# Patient Record
Sex: FEMALE | Race: WHITE | NOT HISPANIC OR LATINO | Employment: FULL TIME | URBAN - METROPOLITAN AREA
[De-identification: names, ages, dates, MRNs, and addresses within clinical notes are randomized per-mention and may not be internally consistent; named-entity substitution may affect disease eponyms.]

---

## 2019-10-07 LAB
C TRACH DNA SPEC QL PROBE+SIG AMP: NEGATIVE
HBV SURFACE AG SERPL QL IA: NEGATIVE
HIV 1+2 AB+HIV1 P24 AG SERPL QL IA: NEGATIVE
N GONORRHOEA DNA SPEC QL PROBE+SIG AMP: NEGATIVE
RUBELLA ABY IGG: NORMAL

## 2020-04-08 LAB — GROUP B STREP PCR: NEGATIVE

## 2020-04-13 ENCOUNTER — HOSPITAL ENCOUNTER (OUTPATIENT)
Facility: CLINIC | Age: 25
End: 2020-04-13
Admitting: OBSTETRICS & GYNECOLOGY
Payer: COMMERCIAL

## 2020-04-28 ENCOUNTER — OFFICE VISIT (OUTPATIENT)
Dept: URGENT CARE | Facility: URGENT CARE | Age: 25
End: 2020-04-28
Payer: COMMERCIAL

## 2020-04-28 DIAGNOSIS — Z37.9 NORMAL LABOR: Primary | ICD-10-CM

## 2020-04-28 DIAGNOSIS — Z37.9 NORMAL LABOR: ICD-10-CM

## 2020-04-28 PROCEDURE — 99000 SPECIMEN HANDLING OFFICE-LAB: CPT | Performed by: OBSTETRICS & GYNECOLOGY

## 2020-04-28 PROCEDURE — 99207 ZZC NO BILLABLE SERVICE THIS VISIT: CPT

## 2020-04-28 PROCEDURE — U0003 INFECTIOUS AGENT DETECTION BY NUCLEIC ACID (DNA OR RNA); SEVERE ACUTE RESPIRATORY SYNDROME CORONAVIRUS 2 (SARS-COV-2) (CORONAVIRUS DISEASE [COVID-19]), AMPLIFIED PROBE TECHNIQUE, MAKING USE OF HIGH THROUGHPUT TECHNOLOGIES AS DESCRIBED BY CMS-2020-01-R: HCPCS | Mod: 90 | Performed by: OBSTETRICS & GYNECOLOGY

## 2020-04-28 NOTE — NURSING NOTE
Provided patient with COVID19 education handout.  Elva Armstrong CMA ([facility]) 4/28/2020 2:18 PM

## 2020-04-29 LAB
SARS-COV-2 RNA SPEC QL NAA+PROBE: NOT DETECTED
SPECIMEN SOURCE: NORMAL

## 2020-04-30 ENCOUNTER — HOSPITAL ENCOUNTER (INPATIENT)
Facility: CLINIC | Age: 25
LOS: 1 days | Discharge: HOME | End: 2020-05-01
Attending: OBSTETRICS & GYNECOLOGY | Admitting: OBSTETRICS & GYNECOLOGY
Payer: COMMERCIAL

## 2020-04-30 ENCOUNTER — ANESTHESIA (OUTPATIENT)
Dept: OBGYN | Facility: CLINIC | Age: 25
End: 2020-04-30

## 2020-04-30 ENCOUNTER — ANESTHESIA EVENT (OUTPATIENT)
Dept: OBGYN | Facility: CLINIC | Age: 25
End: 2020-04-30

## 2020-04-30 PROBLEM — Z87.59 STATUS POST VACUUM-ASSISTED VAGINAL DELIVERY: Status: ACTIVE | Noted: 2020-04-30

## 2020-04-30 PROBLEM — Z37.9 NORMAL LABOR: Status: ACTIVE | Noted: 2020-04-30

## 2020-04-30 LAB
ABO + RH BLD: NORMAL
ABO + RH BLD: NORMAL
BASOPHILS # BLD AUTO: 0 10E9/L (ref 0–0.2)
BASOPHILS NFR BLD AUTO: 0.2 %
BLD GP AB SCN SERPL QL: NORMAL
BLOOD BANK CMNT PATIENT-IMP: NORMAL
DIFFERENTIAL METHOD BLD: ABNORMAL
EOSINOPHIL # BLD AUTO: 0.1 10E9/L (ref 0–0.7)
EOSINOPHIL NFR BLD AUTO: 1.4 %
ERYTHROCYTE [DISTWIDTH] IN BLOOD BY AUTOMATED COUNT: 14 % (ref 10–15)
HCT VFR BLD AUTO: 37.5 % (ref 35–47)
HGB BLD-MCNC: 12.1 G/DL (ref 11.7–15.7)
IMM GRANULOCYTES # BLD: 0 10E9/L (ref 0–0.4)
IMM GRANULOCYTES NFR BLD: 0.4 %
LYMPHOCYTES # BLD AUTO: 1.4 10E9/L (ref 0.8–5.3)
LYMPHOCYTES NFR BLD AUTO: 16.8 %
MCH RBC QN AUTO: 28.1 PG (ref 26.5–33)
MCHC RBC AUTO-ENTMCNC: 32.3 G/DL (ref 31.5–36.5)
MCV RBC AUTO: 87 FL (ref 78–100)
MONOCYTES # BLD AUTO: 0.8 10E9/L (ref 0–1.3)
MONOCYTES NFR BLD AUTO: 9.5 %
NEUTROPHILS # BLD AUTO: 5.8 10E9/L (ref 1.6–8.3)
NEUTROPHILS NFR BLD AUTO: 71.7 %
NRBC # BLD AUTO: 0 10*3/UL
NRBC BLD AUTO-RTO: 0 /100
PLATELET # BLD AUTO: 116 10E9/L (ref 150–450)
RBC # BLD AUTO: 4.3 10E12/L (ref 3.8–5.2)
SPECIMEN EXP DATE BLD: NORMAL
T PALLIDUM AB SER QL: NONREACTIVE
WBC # BLD AUTO: 8 10E9/L (ref 4–11)

## 2020-04-30 PROCEDURE — 36415 COLL VENOUS BLD VENIPUNCTURE: CPT | Performed by: OBSTETRICS & GYNECOLOGY

## 2020-04-30 PROCEDURE — 10907ZC DRAINAGE OF AMNIOTIC FLUID, THERAPEUTIC FROM PRODUCTS OF CONCEPTION, VIA NATURAL OR ARTIFICIAL OPENING: ICD-10-PCS | Performed by: OBSTETRICS & GYNECOLOGY

## 2020-04-30 PROCEDURE — G0378 HOSPITAL OBSERVATION PER HR: HCPCS

## 2020-04-30 PROCEDURE — 86850 RBC ANTIBODY SCREEN: CPT | Performed by: OBSTETRICS & GYNECOLOGY

## 2020-04-30 PROCEDURE — 85025 COMPLETE CBC W/AUTO DIFF WBC: CPT | Performed by: OBSTETRICS & GYNECOLOGY

## 2020-04-30 PROCEDURE — 12000035 ZZH R&B POSTPARTUM

## 2020-04-30 PROCEDURE — 25000125 ZZHC RX 250: Performed by: OBSTETRICS & GYNECOLOGY

## 2020-04-30 PROCEDURE — 37000011 ZZH ANESTHESIA WARD SERVICE

## 2020-04-30 PROCEDURE — 86780 TREPONEMA PALLIDUM: CPT | Performed by: OBSTETRICS & GYNECOLOGY

## 2020-04-30 PROCEDURE — 72200001 ZZH LABOR CARE VAGINAL DELIVERY SINGLE

## 2020-04-30 PROCEDURE — 0HQ9XZZ REPAIR PERINEUM SKIN, EXTERNAL APPROACH: ICD-10-PCS | Performed by: OBSTETRICS & GYNECOLOGY

## 2020-04-30 PROCEDURE — 86901 BLOOD TYPING SEROLOGIC RH(D): CPT | Performed by: OBSTETRICS & GYNECOLOGY

## 2020-04-30 PROCEDURE — 25000128 H RX IP 250 OP 636: Performed by: ANESTHESIOLOGY

## 2020-04-30 PROCEDURE — 25000132 ZZH RX MED GY IP 250 OP 250 PS 637: Performed by: OBSTETRICS & GYNECOLOGY

## 2020-04-30 PROCEDURE — 25800030 ZZH RX IP 258 OP 636: Performed by: OBSTETRICS & GYNECOLOGY

## 2020-04-30 PROCEDURE — 12000000 ZZH R&B MED SURG/OB

## 2020-04-30 PROCEDURE — 86900 BLOOD TYPING SEROLOGIC ABO: CPT | Performed by: OBSTETRICS & GYNECOLOGY

## 2020-04-30 RX ORDER — TRANEXAMIC ACID 10 MG/ML
1 INJECTION, SOLUTION INTRAVENOUS EVERY 30 MIN PRN
Status: DISCONTINUED | OUTPATIENT
Start: 2020-04-30 | End: 2020-04-30

## 2020-04-30 RX ORDER — CARBOPROST TROMETHAMINE 250 UG/ML
250 INJECTION, SOLUTION INTRAMUSCULAR
Status: DISCONTINUED | OUTPATIENT
Start: 2020-04-30 | End: 2020-04-30

## 2020-04-30 RX ORDER — NALOXONE HYDROCHLORIDE 0.4 MG/ML
.1-.4 INJECTION, SOLUTION INTRAMUSCULAR; INTRAVENOUS; SUBCUTANEOUS
Status: DISCONTINUED | OUTPATIENT
Start: 2020-04-30 | End: 2020-04-30

## 2020-04-30 RX ORDER — ROPIVACAINE HYDROCHLORIDE 2 MG/ML
10 INJECTION, SOLUTION EPIDURAL; INFILTRATION; PERINEURAL ONCE
Status: DISCONTINUED | OUTPATIENT
Start: 2020-04-30 | End: 2020-05-01 | Stop reason: HOSPADM

## 2020-04-30 RX ORDER — HYDROCORTISONE 2.5 %
CREAM (GRAM) TOPICAL 3 TIMES DAILY PRN
Status: DISCONTINUED | OUTPATIENT
Start: 2020-04-30 | End: 2020-05-01 | Stop reason: HOSPADM

## 2020-04-30 RX ORDER — EPHEDRINE SULFATE 50 MG/ML
5 INJECTION, SOLUTION INTRAMUSCULAR; INTRAVENOUS; SUBCUTANEOUS
Status: DISCONTINUED | OUTPATIENT
Start: 2020-04-30 | End: 2020-05-01 | Stop reason: HOSPADM

## 2020-04-30 RX ORDER — OXYTOCIN 10 [USP'U]/ML
10 INJECTION, SOLUTION INTRAMUSCULAR; INTRAVENOUS
Status: DISCONTINUED | OUTPATIENT
Start: 2020-04-30 | End: 2020-05-01 | Stop reason: HOSPADM

## 2020-04-30 RX ORDER — OXYTOCIN 10 [USP'U]/ML
10 INJECTION, SOLUTION INTRAMUSCULAR; INTRAVENOUS
Status: DISCONTINUED | OUTPATIENT
Start: 2020-04-30 | End: 2020-04-30

## 2020-04-30 RX ORDER — IBUPROFEN 400 MG/1
800 TABLET, FILM COATED ORAL
Status: DISCONTINUED | OUTPATIENT
Start: 2020-04-30 | End: 2020-05-01 | Stop reason: HOSPADM

## 2020-04-30 RX ORDER — OXYTOCIN/0.9 % SODIUM CHLORIDE 30/500 ML
1-24 PLASTIC BAG, INJECTION (ML) INTRAVENOUS CONTINUOUS
Status: DISCONTINUED | OUTPATIENT
Start: 2020-04-30 | End: 2020-04-30

## 2020-04-30 RX ORDER — OXYTOCIN/0.9 % SODIUM CHLORIDE 30/500 ML
100-340 PLASTIC BAG, INJECTION (ML) INTRAVENOUS CONTINUOUS PRN
Status: DISCONTINUED | OUTPATIENT
Start: 2020-04-30 | End: 2020-04-30

## 2020-04-30 RX ORDER — ACETAMINOPHEN 325 MG/1
650 TABLET ORAL EVERY 4 HOURS PRN
Status: DISCONTINUED | OUTPATIENT
Start: 2020-04-30 | End: 2020-04-30

## 2020-04-30 RX ORDER — FENTANYL CITRATE 50 UG/ML
50-100 INJECTION, SOLUTION INTRAMUSCULAR; INTRAVENOUS
Status: DISCONTINUED | OUTPATIENT
Start: 2020-04-30 | End: 2020-04-30

## 2020-04-30 RX ORDER — TRANEXAMIC ACID 10 MG/ML
1 INJECTION, SOLUTION INTRAVENOUS EVERY 30 MIN PRN
Status: DISCONTINUED | OUTPATIENT
Start: 2020-04-30 | End: 2020-05-01 | Stop reason: HOSPADM

## 2020-04-30 RX ORDER — OXYTOCIN/0.9 % SODIUM CHLORIDE 30/500 ML
100 PLASTIC BAG, INJECTION (ML) INTRAVENOUS CONTINUOUS
Status: DISCONTINUED | OUTPATIENT
Start: 2020-04-30 | End: 2020-05-01 | Stop reason: HOSPADM

## 2020-04-30 RX ORDER — AMOXICILLIN 250 MG
1 CAPSULE ORAL 2 TIMES DAILY
Status: DISCONTINUED | OUTPATIENT
Start: 2020-04-30 | End: 2020-05-01 | Stop reason: HOSPADM

## 2020-04-30 RX ORDER — ONDANSETRON 4 MG/1
4 TABLET, ORALLY DISINTEGRATING ORAL EVERY 6 HOURS PRN
Status: DISCONTINUED | OUTPATIENT
Start: 2020-04-30 | End: 2020-05-01 | Stop reason: HOSPADM

## 2020-04-30 RX ORDER — BISACODYL 10 MG
10 SUPPOSITORY, RECTAL RECTAL DAILY PRN
Status: DISCONTINUED | OUTPATIENT
Start: 2020-05-02 | End: 2020-05-01 | Stop reason: HOSPADM

## 2020-04-30 RX ORDER — OXYCODONE AND ACETAMINOPHEN 5; 325 MG/1; MG/1
1 TABLET ORAL
Status: DISCONTINUED | OUTPATIENT
Start: 2020-04-30 | End: 2020-04-30

## 2020-04-30 RX ORDER — OXYTOCIN/0.9 % SODIUM CHLORIDE 30/500 ML
340 PLASTIC BAG, INJECTION (ML) INTRAVENOUS CONTINUOUS PRN
Status: DISCONTINUED | OUTPATIENT
Start: 2020-04-30 | End: 2020-05-01 | Stop reason: HOSPADM

## 2020-04-30 RX ORDER — ONDANSETRON 2 MG/ML
4 INJECTION INTRAMUSCULAR; INTRAVENOUS EVERY 6 HOURS PRN
Status: DISCONTINUED | OUTPATIENT
Start: 2020-04-30 | End: 2020-05-01 | Stop reason: HOSPADM

## 2020-04-30 RX ORDER — NALOXONE HYDROCHLORIDE 0.4 MG/ML
.1-.4 INJECTION, SOLUTION INTRAMUSCULAR; INTRAVENOUS; SUBCUTANEOUS
Status: DISCONTINUED | OUTPATIENT
Start: 2020-04-30 | End: 2020-05-01 | Stop reason: HOSPADM

## 2020-04-30 RX ORDER — MODIFIED LANOLIN
OINTMENT (GRAM) TOPICAL
Status: DISCONTINUED | OUTPATIENT
Start: 2020-04-30 | End: 2020-05-01 | Stop reason: HOSPADM

## 2020-04-30 RX ORDER — ACETAMINOPHEN 325 MG/1
650 TABLET ORAL EVERY 4 HOURS PRN
Status: DISCONTINUED | OUTPATIENT
Start: 2020-04-30 | End: 2020-05-01 | Stop reason: HOSPADM

## 2020-04-30 RX ORDER — NALBUPHINE HYDROCHLORIDE 10 MG/ML
2.5-5 INJECTION, SOLUTION INTRAMUSCULAR; INTRAVENOUS; SUBCUTANEOUS EVERY 6 HOURS PRN
Status: DISCONTINUED | OUTPATIENT
Start: 2020-04-30 | End: 2020-05-01 | Stop reason: HOSPADM

## 2020-04-30 RX ORDER — METHYLERGONOVINE MALEATE 0.2 MG/ML
200 INJECTION INTRAVENOUS
Status: DISCONTINUED | OUTPATIENT
Start: 2020-04-30 | End: 2020-04-30

## 2020-04-30 RX ORDER — SODIUM CHLORIDE, SODIUM LACTATE, POTASSIUM CHLORIDE, CALCIUM CHLORIDE 600; 310; 30; 20 MG/100ML; MG/100ML; MG/100ML; MG/100ML
INJECTION, SOLUTION INTRAVENOUS CONTINUOUS
Status: DISCONTINUED | OUTPATIENT
Start: 2020-04-30 | End: 2020-04-30

## 2020-04-30 RX ORDER — LIDOCAINE 40 MG/G
CREAM TOPICAL
Status: DISCONTINUED | OUTPATIENT
Start: 2020-04-30 | End: 2020-04-30

## 2020-04-30 RX ORDER — ONDANSETRON 2 MG/ML
4 INJECTION INTRAMUSCULAR; INTRAVENOUS EVERY 6 HOURS PRN
Status: DISCONTINUED | OUTPATIENT
Start: 2020-04-30 | End: 2020-04-30

## 2020-04-30 RX ORDER — IBUPROFEN 400 MG/1
800 TABLET, FILM COATED ORAL EVERY 6 HOURS PRN
Status: DISCONTINUED | OUTPATIENT
Start: 2020-04-30 | End: 2020-05-01 | Stop reason: HOSPADM

## 2020-04-30 RX ORDER — AMOXICILLIN 250 MG
2 CAPSULE ORAL 2 TIMES DAILY
Status: DISCONTINUED | OUTPATIENT
Start: 2020-04-30 | End: 2020-05-01 | Stop reason: HOSPADM

## 2020-04-30 RX ADMIN — Medication 12 ML/HR: at 15:50

## 2020-04-30 RX ADMIN — SODIUM CHLORIDE, POTASSIUM CHLORIDE, SODIUM LACTATE AND CALCIUM CHLORIDE: 600; 310; 30; 20 INJECTION, SOLUTION INTRAVENOUS at 13:00

## 2020-04-30 RX ADMIN — ACETAMINOPHEN 650 MG: 325 TABLET, FILM COATED ORAL at 22:31

## 2020-04-30 RX ADMIN — IBUPROFEN 800 MG: 400 TABLET, FILM COATED ORAL at 22:31

## 2020-04-30 RX ADMIN — SODIUM CHLORIDE, POTASSIUM CHLORIDE, SODIUM LACTATE AND CALCIUM CHLORIDE: 600; 310; 30; 20 INJECTION, SOLUTION INTRAVENOUS at 16:30

## 2020-04-30 RX ADMIN — Medication 2 MILLI-UNITS/MIN: at 09:06

## 2020-04-30 RX ADMIN — Medication 12 ML/HR: at 11:04

## 2020-04-30 RX ADMIN — ROPIVACAINE HYDROCHLORIDE 10 ML: 2 INJECTION, SOLUTION EPIDURAL; INFILTRATION at 11:40

## 2020-04-30 SDOH — HEALTH STABILITY: MENTAL HEALTH: HOW OFTEN DO YOU HAVE A DRINK CONTAINING ALCOHOL?: NEVER

## 2020-04-30 ASSESSMENT — MIFFLIN-ST. JEOR: SCORE: 1507.46

## 2020-04-30 NOTE — PLAN OF CARE
"Patient requesting epidural at about 1030, fluid bolus given and patient positioned.  Dr Torres here for epidural, during test dose patient HR increased from 80 to 105 and patient \"felt like ears were going to pop\" and was dizzy, but resolved.  Epidural procedure completed and after 20 minutes patient was feeling no relief.  Dr Torres called back to room to assess.  Decision made to pull epidural and replace.  Patient tolerated well and was feeling some relief after this but still uncomfortable with pressure and tightness.  Dr Graham came to room to assess and gave ok to use PCEA as needed.  Patient pressed this twice and is now feeling more comfortable.  Cervix 5/90/0, Dr Biggs updated via pager.  Will continue to monitor.  "

## 2020-04-30 NOTE — PLAN OF CARE
VE x1, no pop offs, 35 sec accrual time with delivery of viable male infant.  Apgars 8/9.  See delivery summary.

## 2020-04-30 NOTE — PLAN OF CARE
Cha is a  admitted for an elective induction at 39w0d.  She states that his has been a healthy pregnancy with no significant complications or monitoring.  GBS negative, no bleeding or leaking of fluid.  Admitted to room 213 and oriented to room and plan of care, all questions answered.  Dr Biggs came to AROM, clear fluid.  Pitocin started and patient starting to get uncomfortable with contractions, plan for early epidural.  Will continue to monitor.

## 2020-04-30 NOTE — H&P
[facility] Labor and Delivery History and Physical    Cha Goode MRN# [id number]   Age: 24 year old YOB: 1995     Date of Admission:  2020    Primary care provider: Hellen Biggs           Chief Complaint:   Cha Goode is a 24 year old female  who is 39w0d pregnant and being admitted for induction of labor, indication elective with favorable cervix.  Pt with regular prenatal care; sequential test normal; normal 20 week ultrasound; she transferred care to us in 3rd trimester. GBS negative; pt desires elective induction. COVID negative 2 days ago.          Pregnancy history:     OBSTETRIC HISTORY:    OB History    Para Term  AB Living   1 0 0 0 0 0   SAB TAB Ectopic Multiple Live Births   0 0 0 0 0      # Outcome Date GA Lbr Juan Jose/2nd Weight Sex Delivery Anes PTL Lv   1 Current                EDC: Estimated Date of Delivery: 20    Prenatal Labs: No results found for: ABO, RH, AS, HEPBANG, CHPCRT, GCPCRT, TREPAB, RUBELLAABIGG, HGB, HIV    GBS Status:   No results found for: GBS    Active Problem List  Patient Active Problem List   Diagnosis     Normal labor       Medication Prior to Admission  Medications Prior to Admission   Medication Sig Dispense Refill Last Dose     ferrous sulfate 220 (44 Fe) MG/5ML ELIX Take 220 mg by mouth daily   2020 at Unknown time     Prenatal Vit-Fe Fumarate-FA (PRENATAL MULTIVITAMIN  PLUS IRON) 27-1 MG TABS Take 1 tablet by mouth daily   2020 at Unknown time   .        Maternal Past Medical History:   History reviewed. No pertinent past medical history.                    Family History:   This patient has no significant family history            Social History:   This patient has no significant social history         Review of Systems:   The Review of Systems is negative other than noted in the HPI          Physical Exam:   Vitals were reviewed  Temp: 98.8  F (37.1  C) Temp src: Temporal BP: 124/83                  Cervix: 1 1/2cm/70%/0 station; AROM with clear fluid  Presentation:Cephalic  Fetal Heart Rate Tracing: reactive and reassuring  Tocometer: external monitor  EFW by my Leop[place] 7lb 10 oz.                       Assessment:   Cha Goode is a  at 39w0d pregnant female admitted with induction of labor, indication elective with favorable cervix.          Plan:   1. Start pitocin; epidural prn.    Hellen Biggs MD

## 2020-04-30 NOTE — L&D DELIVERY NOTE
VAGINAL DELIVERY NOTE    Date of Delivery:  2020    Cha Goode is a 24 year old  who was admitted at 39w0d due to elective induction with favorable cervix.   Her prenatal course was significant for normal care.   Her GBS was negative.  After admission to Labor and Delivery, the patient started on pitocin. Patient had regular contractions.   She received epidural for pain.  She has AROM with clear fluid at 2 cm dilated.   During the first stage the FHRT was category I.     The patient progressed to complete dilation. The FHT's were category I and were monitored with external; a scalp electrode was placed during pushing to better evaluate the depth of the variable decelerations which were mild at that time.  She pushed with good effort for less than 30 minutes. Due to deepening variable decelerations (there was moderate variability and reactive for FHRT), vacuum assisted vaginal delivery was discussed. Alternative strategies discussed and charge and NICU teams notified; risks of vacuum to the baby discussed in detail and verbal consent obtained from the patient; the Kiwi vacuum was applied at 4+ station TAWNY and one pull for 15 seconds and no popoffs resulted in delivery of vertex over intact perineum.     The fetal vertex delivered over an intact perineum in an TAWNY position. A nuchal cord was not present but there was a somewhat short cord. The remainder of the baby was delivered easily- a viable male infant with Apgars of 8/9. There was no shoulder dystocia present. The baby was placed on the maternal abdomen and delayed cord clamping was done. The weight was not done immediately so as to maximize bonding and skin to skin contact.  The patient received IV pitocin immediately after delivery. Cord blood was obtained.  The placenta delivered spontaneously intact and was not sent to pathology.   The baby's head was inspected and normal caput noted and no abnormalities.    There was a left vaginal  laceration that extended outside to lower labia that was repaired with 3-0 Vicryl in the standard fashion with good hemostasis. A first degree laceration above the urethra was repaired with one suture of 4-0 vicryl.   The vagina and perineum were inspected and no additional tears noted. Count were correct and the fundus was massaged with the RN and it was firm with no active clots or bleeding.   EBL=150 ml.    Mother and infant were doing well.      No gross fetal defects were observed.  The baby was stable in mom's arms.  The mother was stable in delivery bed.  Sponge and sharp count is correct. There were no complications.    Hellen Biggs MD

## 2020-05-01 VITALS
OXYGEN SATURATION: 99 % | BODY MASS INDEX: 25.43 KG/M2 | HEIGHT: 67 IN | SYSTOLIC BLOOD PRESSURE: 105 MMHG | HEART RATE: 83 BPM | WEIGHT: 162 LBS | RESPIRATION RATE: 16 BRPM | TEMPERATURE: 98.2 F | DIASTOLIC BLOOD PRESSURE: 70 MMHG

## 2020-05-01 PROCEDURE — 25000132 ZZH RX MED GY IP 250 OP 250 PS 637: Performed by: OBSTETRICS & GYNECOLOGY

## 2020-05-01 RX ADMIN — ACETAMINOPHEN 650 MG: 325 TABLET, FILM COATED ORAL at 04:23

## 2020-05-01 RX ADMIN — ACETAMINOPHEN 650 MG: 325 TABLET, FILM COATED ORAL at 11:04

## 2020-05-01 RX ADMIN — IBUPROFEN 800 MG: 400 TABLET, FILM COATED ORAL at 04:23

## 2020-05-01 RX ADMIN — SENNOSIDES AND DOCUSATE SODIUM 1 TABLET: 8.6; 5 TABLET ORAL at 11:04

## 2020-05-01 RX ADMIN — IBUPROFEN 800 MG: 400 TABLET, FILM COATED ORAL at 11:04

## 2020-05-01 NOTE — ANESTHESIA POSTPROCEDURE EVALUATION
Patient: Cha Goode    * No procedures listed *    Diagnosis:* No pre-op diagnosis entered *  Diagnosis Additional Information: No value filed.    Anesthesia Type:  No value filed.    Note:  Anesthesia Post Evaluation    Patient location during evaluation: Floor (Postpartum Unit)  Patient participation: Able to fully participate in evaluation  Level of consciousness: awake and alert  Pain management: adequate  Airway patency: patent  Cardiovascular status: acceptable and hemodynamically stable  Respiratory status: acceptable, spontaneous ventilation and unassisted  Hydration status: acceptable  PONV: none       Comments: Pt denies epidural-related complaints.         Last vitals:  Vitals:    04/30/20 2012 05/01/20 0103 05/01/20 0800   BP: 120/78 105/68 102/68   Pulse: 113 83    Resp: 16 14 16   Temp: 37.2  C (98.9  F) 36.7  C (98.1  F) 36.8  C (98.2  F)   SpO2:            Electronically Signed By: Israel Barros MD  May 1, 2020  3:18 PM

## 2020-05-01 NOTE — PLAN OF CARE
VSS. Fundus firm and midline. Scant flow. Pain /010, pain controled with tylenol and ibuprofen per MAR. Breastfeeding. Ambulating free of dizziness or lightheaded. Voiding without difficulty. Encouraged to call with needs, questions, or concerns. Will continue to monitor.     Discharge instructions reviewed. Questions answered. Baby bands checked. Patient discharged with family at 1720.

## 2020-05-01 NOTE — PLAN OF CARE
Data: Cha Goode transferred to [facility] via wheelchair at 1945. Baby transferred via parent's arms.  Action: Receiving unit notified of transfer: Yes. Patient and family notified of room change. Report given to LENA Moya RN at 1950. Belongings sent to receiving unit. Accompanied by Registered Nurse. Oriented patient to surroundings. Call light within reach. ID bands double-checked with receiving RN.  Response: Patient tolerated transfer and is stable.

## 2020-05-01 NOTE — ANESTHESIA POSTPROCEDURE EVALUATION
Patient: Cha Goode        Diagnosis:* No surgery found *  Diagnosis Additional Information: No value filed.    Anesthesia Type:  No value filed.    Note:  Anesthesia Post Evaluation    Patient location during evaluation: Floor (Postpartum Unit)  Patient participation: Able to fully participate in evaluation  Level of consciousness: awake and alert  Pain management: adequate  Airway patency: patent  Cardiovascular status: acceptable and hemodynamically stable  Respiratory status: acceptable, spontaneous ventilation and unassisted  Hydration status: acceptable  PONV: none       Comments: Pt denies epidural-related complaints.         Last vitals:  Vitals:    04/30/20 2012 05/01/20 0103 05/01/20 0800   BP: 120/78 105/68 102/68   Pulse: 113 83    Resp: 16 14 16   Temp: 37.2  C (98.9  F) 36.7  C (98.1  F) 36.8  C (98.2  F)   SpO2:            Electronically Signed By: Israel Barros MD  May 1, 2020  3:06 PM

## 2020-05-01 NOTE — PROGRESS NOTES
"Cha Goode  May 1, 2020    S: pt doing well.  Pain well controlled,  Ambulating without difficulty.  Tolerating po intake.  Decreased lochia.  Breast feeding.    O: /68   Pulse 83   Temp 98.1  F (36.7  C) (Oral)   Resp 14   Ht 1.702 m (5' 7\")   Wt 73.5 kg (162 lb)   SpO2 99%   Breastfeeding Unknown   BMI 25.37 kg/m    Recent Labs   Lab 20  0907   HGB 12.1     Abdomen: soft, non tender, fundus firm below the umbilicus  Ext: non tender, no edema or erythema    A/P: s/p  PPD #1  Doing well  Continue routine post partum care  Discharge planning for today    Grace Benson MD  "

## 2020-05-01 NOTE — PLAN OF CARE
Vital signs stable. Postpartum assessment WDL. Pain controlled with tylenol and ibuprofen. Patient voiding without difficulty. Breastfeeding on cue with assist. IV removed. Patient and infant bonding well. Will continue with current plan of care.

## 2020-05-01 NOTE — LACTATION NOTE
Initial visit with Cha and infant. Infant sleepy and skin to skin with Cha through visit. Easily hand expressed drops. Infant did not wake. Discussed keeping infant skin to skin and attempting feeding again within 2-3 hours or sooner if infant cues. Cha states feeds have been going well and this is infant's first sleepy phase.  Breastfeeding general information reviewed.   Advised to breastfeed exclusively, on demand, avoid pacifiers, bottles and formula unless medically indicated.  Encouraged rooming in, skin to skin, feeding on demand 8-12x/day or sooner if baby cues.  Explained benefits of holding and skin to skin. Discussed typical feeding pattern for the next 24-48 hours.  Breastfeeding going well per mother. Pt has pump for use at home.  No further questions at this time. Will follow as needed.     BROOK Quigley RN, BSN, PHN, IBCLC

## 2020-05-01 NOTE — LACTATION NOTE
Follow up Lactation visit with Cah, significant other Aftab, & baby boy. Cha reports feeding is going well in general, but baby has been sleepier at last two feeds. Cha has hand expressed drops of EBM at both feeds and given them via spoon. Getting ready for discharge this afternoon, pending baby's 24 hour screenings. Reviewed milk supply and engorgement. Reviewed typical timeline of milk supply initiation and progression over first 3-5 days postpartum. Discussed comfort measures for engorgement, plugged duct treatment, and warning signs of breast infection.    Cha had questions about when to start pumping to store milk, as she is going back to work in only 5 weeks. Reviewed general recommendation to wait to start pumping to store milk until breastfeeding is well established unless infant needs to supplement for medical reasons or feeding is poor. Since baby has been sleepier today, discussed pumping at home after feedings if feedings do not go well or he is sleepy at breast. Also discussed she could start pumping at 3-4 weeks after delivery if she would like to, to store milk at that time. Cha appreciative of informaiton.    Feeding plan: Recommend unlimited, frequent breast feedings: At least 8 - 12 times every 24 hours. Pump if baby is not feeding well at home, and see Lactation or pediatric provider if breastfeeding is not going well. Avoid pacifiers and supplementation with formula unless medically indicated. Encouraged use of feeding log and to record feedings, and void/stool patterns. Cha has a pump for home use. Follow up with Edna Grissom, encouraged Lactation follow up within the week due to early discharge and first time breastfeeding. Reviewed outpatient lactation resources. Cha appreciative of visit.    Deb Santiago, RN-C, IBCLC, MNN, PHN, BSN

## 2020-05-01 NOTE — PLAN OF CARE
Vital signs  and assessments wnl. Patient is up independently, voiding , pain well controlled with tylenol and ibuprofen given as prescribed. Encouraged to continue to ambulate as able and void frequently. Patient is bonding well with infant.

## 2020-05-05 RX ORDER — ROPIVACAINE HYDROCHLORIDE 2 MG/ML
INJECTION, SOLUTION EPIDURAL; INFILTRATION; PERINEURAL PRN
Status: DISCONTINUED | OUTPATIENT
Start: 2020-04-30 | End: 2020-05-05 | Stop reason: HOSPADM

## 2020-05-05 NOTE — ANESTHESIA PROCEDURE NOTES
Procedure note : epidural catheter  Staff -   Anesthesiologist:  Jani Torres MD      Performed By: anesthesiologist        Pre-Procedure  Performed by Jani Torres MD  Location: OB      Pre-Anesthestic Checklist: patient identified, IV checked, risks and benefits discussed, informed consent, monitors and equipment checked, pre-op evaluation and at physician/surgeon's request    Timeout  Correct Patient: Yes   Correct Procedure: Yes   Correct Site: Yes   Correct Laterality: N/A   Correct Position: Yes   Site Marked: N/A   .   Procedure Documentation    .    Procedure: epidural catheter, .   Patient Position:sitting Insertion Site:L2-3  (midline approach) Injection technique: LORT saline   Local skin infiltrated with mL of 1% lidocaine.  SABIHA at 5 cm    Patient Prep/Sterile Barriers; mask, sterile gloves, povidone-iodine 7.5% surgical scrub, patient draped.  .  Needle: Touhy needle   Needle Gauge: 17.    Needle Length (Inches) 5   # of attempts: 1 and # of redirects: : 0. .    Catheter: 19 G . .  Catheter threaded easily  5 cm epidural space.  10 cm at skin.   .    Assessment/Narrative  Paresthesias: No.  .  .  No aspiration negative for heme or CSF  . Test dose of 3 mL lidocaine 1.5% w/ 1:200,000 epinephrine at. Test dose negative for signs of intravascular, subdural or intrathecal injection. Comments:  Pt tolerated well.    Taped sterile and secure.   FHTs stable post-procedure.   No complications.

## 2021-10-22 NOTE — ANESTHESIA PREPROCEDURE EVALUATION
"Anesthesia Pre-Procedure Evaluation    Patient: Cha Goode   MRN: [id number] : 1995          Preoperative Diagnosis: * No surgery found *        History reviewed. No pertinent past medical history.  History reviewed. No pertinent surgical history.    Anesthesia Evaluation     .             ROS/MED HX    ENT/Pulmonary:  - neg pulmonary ROS    (-) recent URI   Neurologic:  - neg neurologic ROS     Cardiovascular:        (-) PIH   METS/Exercise Tolerance:     Hematologic:         Musculoskeletal:         GI/Hepatic:     (+) GERD       Renal/Genitourinary:         Endo:         Psychiatric:         Infectious Disease:         Malignancy:         Other:                       (-) no pre-eclampsia and gestational diabetes          Physical Exam      Airway   Mallampati: III  TM distance: > 3 FB  Neck ROM: full  Mouth opening: > 3 cm    Dental     Cardiovascular       Pulmonary             Lab Results   Component Value Date    WBC 8.0 2020    HGB 12.1 2020    HCT 37.5 2020     (L) 2020       Preop Vitals  BP Readings from Last 3 Encounters:   20 124/83    Pulse Readings from Last 3 Encounters:   No data found for Pulse      Resp Readings from Last 3 Encounters:   No data found for Resp    SpO2 Readings from Last 3 Encounters:   No data found for SpO2      Temp Readings from Last 1 Encounters:   20 36.8  C (98.2  F) (Temporal)    Ht Readings from Last 1 Encounters:   20 1.702 m (5' 7\")      Wt Readings from Last 1 Encounters:   20 73.5 kg (162 lb)    Estimated body mass index is 25.37 kg/m  as calculated from the following:    Height as of this encounter: 1.702 m (5' 7\").    Weight as of this encounter: 73.5 kg (162 lb).       Anesthesia Plan      History & Physical Review  History and physical reviewed and following examination; no interval change.    ASA Status:  2 .  OB Epidural Asa: 2       Plan for Epidural            Postoperative " Care      Consents  Anesthetic plan, risks, benefits and alternatives discussed with:  Patient and Patient..                 Jani Torres MD   Patient/Caregiver provided printed discharge information.

## 2022-08-03 ENCOUNTER — TRANSCRIBE ORDERS (OUTPATIENT)
Dept: MATERNAL FETAL MEDICINE | Facility: CLINIC | Age: 27
End: 2022-08-03

## 2022-08-03 DIAGNOSIS — O26.90 PREGNANCY RELATED CONDITION, ANTEPARTUM: Primary | ICD-10-CM

## 2022-09-19 ENCOUNTER — PRE VISIT (OUTPATIENT)
Dept: MATERNAL FETAL MEDICINE | Facility: CLINIC | Age: 27
End: 2022-09-19

## 2022-09-26 ENCOUNTER — OFFICE VISIT (OUTPATIENT)
Dept: MATERNAL FETAL MEDICINE | Facility: CLINIC | Age: 27
End: 2022-09-26
Attending: OBSTETRICS & GYNECOLOGY
Payer: COMMERCIAL

## 2022-09-26 ENCOUNTER — HOSPITAL ENCOUNTER (OUTPATIENT)
Dept: ULTRASOUND IMAGING | Facility: CLINIC | Age: 27
Discharge: HOME | End: 2022-09-26
Attending: OBSTETRICS & GYNECOLOGY
Payer: COMMERCIAL

## 2022-09-26 DIAGNOSIS — O98.512 COVID-19 AFFECTING PREGNANCY IN SECOND TRIMESTER: Primary | ICD-10-CM

## 2022-09-26 DIAGNOSIS — O26.90 PREGNANCY RELATED CONDITION, ANTEPARTUM: ICD-10-CM

## 2022-09-26 DIAGNOSIS — U07.1 COVID-19 AFFECTING PREGNANCY IN SECOND TRIMESTER: Primary | ICD-10-CM

## 2022-09-26 PROCEDURE — 76811 OB US DETAILED SNGL FETUS: CPT | Mod: 26 | Performed by: OBSTETRICS & GYNECOLOGY

## 2022-09-26 PROCEDURE — 76811 OB US DETAILED SNGL FETUS: CPT

## 2022-09-26 NOTE — PROGRESS NOTES
"Please see \"imaging\" tab under \"Chart Review\" for details of today's US at the [facility].    James Penaloza MD  Maternal-Fetal Medicine    "

## 2022-12-29 ENCOUNTER — HOSPITAL ENCOUNTER (OUTPATIENT)
Facility: CLINIC | Age: 27
End: 2022-12-29
Admitting: OBSTETRICS & GYNECOLOGY
Payer: COMMERCIAL

## 2022-12-29 ENCOUNTER — HOSPITAL ENCOUNTER (OUTPATIENT)
Facility: CLINIC | Age: 27
Discharge: HOME | End: 2022-12-29
Attending: OBSTETRICS & GYNECOLOGY | Admitting: OBSTETRICS & GYNECOLOGY
Payer: COMMERCIAL

## 2022-12-29 VITALS — SYSTOLIC BLOOD PRESSURE: 106 MMHG | TEMPERATURE: 99.3 F | DIASTOLIC BLOOD PRESSURE: 63 MMHG

## 2022-12-29 PROCEDURE — 96360 HYDRATION IV INFUSION INIT: CPT

## 2022-12-29 PROCEDURE — 258N000003 HC RX IP 258 OP 636: Performed by: STUDENT IN AN ORGANIZED HEALTH CARE EDUCATION/TRAINING PROGRAM

## 2022-12-29 PROCEDURE — G0463 HOSPITAL OUTPT CLINIC VISIT: HCPCS | Mod: 25

## 2022-12-29 PROCEDURE — 59025 FETAL NON-STRESS TEST: CPT

## 2022-12-29 RX ORDER — ONDANSETRON 2 MG/ML
4 INJECTION INTRAMUSCULAR; INTRAVENOUS EVERY 6 HOURS PRN
Status: DISCONTINUED | OUTPATIENT
Start: 2022-12-29 | End: 2022-12-29 | Stop reason: HOSPADM

## 2022-12-29 RX ORDER — PROCHLORPERAZINE 25 MG
25 SUPPOSITORY, RECTAL RECTAL EVERY 12 HOURS PRN
Status: DISCONTINUED | OUTPATIENT
Start: 2022-12-29 | End: 2022-12-29 | Stop reason: HOSPADM

## 2022-12-29 RX ORDER — ONDANSETRON 4 MG/1
4 TABLET, ORALLY DISINTEGRATING ORAL EVERY 6 HOURS PRN
Status: DISCONTINUED | OUTPATIENT
Start: 2022-12-29 | End: 2022-12-29 | Stop reason: HOSPADM

## 2022-12-29 RX ORDER — PROCHLORPERAZINE MALEATE 5 MG
10 TABLET ORAL EVERY 6 HOURS PRN
Status: DISCONTINUED | OUTPATIENT
Start: 2022-12-29 | End: 2022-12-29 | Stop reason: HOSPADM

## 2022-12-29 RX ORDER — METOCLOPRAMIDE 10 MG/1
10 TABLET ORAL EVERY 6 HOURS PRN
Status: DISCONTINUED | OUTPATIENT
Start: 2022-12-29 | End: 2022-12-29 | Stop reason: HOSPADM

## 2022-12-29 RX ORDER — METOCLOPRAMIDE HYDROCHLORIDE 5 MG/ML
10 INJECTION INTRAMUSCULAR; INTRAVENOUS EVERY 6 HOURS PRN
Status: DISCONTINUED | OUTPATIENT
Start: 2022-12-29 | End: 2022-12-29 | Stop reason: HOSPADM

## 2022-12-29 RX ADMIN — SODIUM CHLORIDE, POTASSIUM CHLORIDE, SODIUM LACTATE AND CALCIUM CHLORIDE 1000 ML: 600; 310; 30; 20 INJECTION, SOLUTION INTRAVENOUS at 15:03

## 2022-12-29 ASSESSMENT — ACTIVITIES OF DAILY LIVING (ADL)
ADLS_ACUITY_SCORE: 35
ADLS_ACUITY_SCORE: 35

## 2022-12-29 NOTE — PROVIDER NOTIFICATION
Spoke with Dr Vaughan regarding pt status. Reactive FHTs, swathi every 5-6, mild to moderate. SVE performed with pt consent, 1/50/-3. Plan to watch pt for 1 hour and reassess SVE at that time.

## 2022-12-29 NOTE — PLAN OF CARE
Pt arrives to MAC for assessment of contractions.  32+0, started swathi more frequently since waking up this morning, between 4-15 min apart, having to stop what she's doing/distracting enough to stop mid-sentence to take a breath.     EUS/TOCO applied. VSS. Prenatal and medical history reviewed with pt.

## 2022-12-29 NOTE — DISCHARGE INSTRUCTIONS
Discharge Instruction for Undelivered Patients      You were seen for: Labor Assessment  We Consulted: Dr Vaughan and Dr Arreguin  You had (Test or Medicine):Fetal monitoring and IV fluids     Diet:   Drink 8 to 12 glasses of liquids (milk, juice, water) every day.     Activity:  Call your doctor or nurse midwife if your baby is moving less than usual.     Call your provider if you notice:  Swelling in your face or increased swelling in your hands or legs.  Headaches that are not relieved by Tylenol (acetaminophen).  Changes in your vision (blurring: seeing spots or stars.)  Nausea (sick to your stomach) and vomiting (throwing up).   Weight gain of 5 pounds or more per week.  Heartburn that doesn't go away.  Signs of bladder infection: pain when you urinate (use the toilet), need to go more often and more urgently.  The bag of duncan (rupture of membranes) breaks, or you notice leaking in your underwear.  Bright red blood in your underwear.  Abdominal (lower belly) or stomach pain.  For first baby: Contractions (tightening) less than 5 minutes apart for one hour or more.  Second (plus) baby: Contractions (tightening) less than 10 minutes apart and getting stronger.  *If less than 34 weeks: Contractions (tightening) more than 6 times in one hour.  Increase or change in vaginal discharge (note the color and amount)    Follow-up:  As scheduled in the clinic

## 2022-12-29 NOTE — PROVIDER NOTIFICATION
Spoke with Dr Vaughan regarding pt status, unchanged SVE- 1/50/-3. Nathalia every 2-7. Discussed pt taking old course of flagyl that she had as the pharmacy filled a topical cream not vaginal. Per Dr Vaughan, Pt is OK to discharge if she is comfortable with that. Pt is hesitant to discharge at this time. Dr Arreguin to assess between patients.

## 2022-12-29 NOTE — PROGRESS NOTES
Cha discharged to home in a stable condition. All questions addressed and answered. Follow up instructions reviewed. Sent with belongings.

## 2022-12-29 NOTE — PROVIDER NOTIFICATION
Dr Arreguin at bedside to discuss with patient her symptoms, story, and assess. Nathalia more frequently in the last 30 minutes. Plan to administer fluid bolus and reassess SVE at 1530.

## 2022-12-29 NOTE — PROVIDER NOTIFICATION
"Dr Vaughan paged with update, \"Biegner here around 245 pm to see pt, at that time pt was swathi every 2-4. Per Biegner- 1L bolus given and advised to recheck SVE at 330 pm. NO change in SVE- still 1/50/-3. Pt feels comfortable with discharge. Will discharge pt, thank you!\"  "

## 2023-01-29 ENCOUNTER — HEALTH MAINTENANCE LETTER (OUTPATIENT)
Age: 28
End: 2023-01-29

## 2023-02-16 ENCOUNTER — ANESTHESIA (OUTPATIENT)
Dept: OBGYN | Facility: CLINIC | Age: 28
End: 2023-02-16
Payer: COMMERCIAL

## 2023-02-16 ENCOUNTER — HOSPITAL ENCOUNTER (INPATIENT)
Facility: CLINIC | Age: 28
LOS: 2 days | Discharge: HOME | End: 2023-02-18
Attending: OBSTETRICS & GYNECOLOGY | Admitting: OBSTETRICS & GYNECOLOGY
Payer: COMMERCIAL

## 2023-02-16 ENCOUNTER — ANESTHESIA EVENT (OUTPATIENT)
Dept: OBGYN | Facility: CLINIC | Age: 28
End: 2023-02-16
Payer: COMMERCIAL

## 2023-02-16 PROBLEM — Z3A.39 39 WEEKS GESTATION OF PREGNANCY: Status: ACTIVE | Noted: 2023-02-16

## 2023-02-16 LAB
ABO/RH(D): NORMAL
ANTIBODY SCREEN: NEGATIVE
ERYTHROCYTE [DISTWIDTH] IN BLOOD BY AUTOMATED COUNT: 13.9 % (ref 10–15)
HCT VFR BLD AUTO: 34.6 % (ref 35–47)
HGB BLD-MCNC: 10.7 G/DL (ref 11.7–15.7)
HOLD SPECIMEN: NORMAL
MCH RBC QN AUTO: 25.5 PG (ref 26.5–33)
MCHC RBC AUTO-ENTMCNC: 30.9 G/DL (ref 31.5–36.5)
MCV RBC AUTO: 82 FL (ref 78–100)
PLATELET # BLD AUTO: 133 10E3/UL (ref 150–450)
RBC # BLD AUTO: 4.2 10E6/UL (ref 3.8–5.2)
SPECIMEN EXPIRATION DATE: NORMAL
T PALLIDUM AB SER QL: NONREACTIVE
WBC # BLD AUTO: 7.5 10E3/UL (ref 4–11)

## 2023-02-16 PROCEDURE — 250N000013 HC RX MED GY IP 250 OP 250 PS 637: Performed by: OBSTETRICS & GYNECOLOGY

## 2023-02-16 PROCEDURE — 3E033VJ INTRODUCTION OF OTHER HORMONE INTO PERIPHERAL VEIN, PERCUTANEOUS APPROACH: ICD-10-PCS | Performed by: OBSTETRICS & GYNECOLOGY

## 2023-02-16 PROCEDURE — 258N000003 HC RX IP 258 OP 636: Performed by: OBSTETRICS & GYNECOLOGY

## 2023-02-16 PROCEDURE — 370N000003 HC ANESTHESIA WARD SERVICE

## 2023-02-16 PROCEDURE — 10907ZC DRAINAGE OF AMNIOTIC FLUID, THERAPEUTIC FROM PRODUCTS OF CONCEPTION, VIA NATURAL OR ARTIFICIAL OPENING: ICD-10-PCS | Performed by: OBSTETRICS & GYNECOLOGY

## 2023-02-16 PROCEDURE — 250N000011 HC RX IP 250 OP 636: Performed by: ANESTHESIOLOGY

## 2023-02-16 PROCEDURE — 36415 COLL VENOUS BLD VENIPUNCTURE: CPT | Performed by: OBSTETRICS & GYNECOLOGY

## 2023-02-16 PROCEDURE — 86901 BLOOD TYPING SEROLOGIC RH(D): CPT | Performed by: OBSTETRICS & GYNECOLOGY

## 2023-02-16 PROCEDURE — 250N000011 HC RX IP 250 OP 636: Performed by: OBSTETRICS & GYNECOLOGY

## 2023-02-16 PROCEDURE — 120N000012 HC R&B POSTPARTUM

## 2023-02-16 PROCEDURE — 85027 COMPLETE CBC AUTOMATED: CPT | Performed by: OBSTETRICS & GYNECOLOGY

## 2023-02-16 PROCEDURE — 250N000009 HC RX 250: Performed by: OBSTETRICS & GYNECOLOGY

## 2023-02-16 PROCEDURE — 86780 TREPONEMA PALLIDUM: CPT | Performed by: OBSTETRICS & GYNECOLOGY

## 2023-02-16 PROCEDURE — 722N000001 HC LABOR CARE VAGINAL DELIVERY SINGLE

## 2023-02-16 RX ORDER — FENTANYL CITRATE-0.9 % NACL/PF 10 MCG/ML
100 PLASTIC BAG, INJECTION (ML) INTRAVENOUS EVERY 5 MIN PRN
Status: DISCONTINUED | OUTPATIENT
Start: 2023-02-16 | End: 2023-02-16 | Stop reason: HOSPADM

## 2023-02-16 RX ORDER — OXYTOCIN/0.9 % SODIUM CHLORIDE 30/500 ML
340 PLASTIC BAG, INJECTION (ML) INTRAVENOUS CONTINUOUS PRN
Status: DISCONTINUED | OUTPATIENT
Start: 2023-02-16 | End: 2023-02-18 | Stop reason: HOSPADM

## 2023-02-16 RX ORDER — CARBOPROST TROMETHAMINE 250 UG/ML
250 INJECTION, SOLUTION INTRAMUSCULAR
Status: DISCONTINUED | OUTPATIENT
Start: 2023-02-16 | End: 2023-02-18 | Stop reason: HOSPADM

## 2023-02-16 RX ORDER — TERBUTALINE SULFATE 1 MG/ML
0.25 INJECTION, SOLUTION SUBCUTANEOUS
Status: DISCONTINUED | OUTPATIENT
Start: 2023-02-16 | End: 2023-02-16 | Stop reason: HOSPADM

## 2023-02-16 RX ORDER — OXYTOCIN/0.9 % SODIUM CHLORIDE 30/500 ML
1-24 PLASTIC BAG, INJECTION (ML) INTRAVENOUS CONTINUOUS
Status: DISCONTINUED | OUTPATIENT
Start: 2023-02-16 | End: 2023-02-16 | Stop reason: HOSPADM

## 2023-02-16 RX ORDER — METHYLERGONOVINE MALEATE 0.2 MG/ML
200 INJECTION INTRAVENOUS
Status: DISCONTINUED | OUTPATIENT
Start: 2023-02-16 | End: 2023-02-18 | Stop reason: HOSPADM

## 2023-02-16 RX ORDER — MISOPROSTOL 200 UG/1
800 TABLET ORAL
Status: DISCONTINUED | OUTPATIENT
Start: 2023-02-16 | End: 2023-02-16 | Stop reason: HOSPADM

## 2023-02-16 RX ORDER — OXYTOCIN 10 [USP'U]/ML
10 INJECTION, SOLUTION INTRAMUSCULAR; INTRAVENOUS
Status: DISCONTINUED | OUTPATIENT
Start: 2023-02-16 | End: 2023-02-16 | Stop reason: HOSPADM

## 2023-02-16 RX ORDER — ONDANSETRON 2 MG/ML
4 INJECTION INTRAMUSCULAR; INTRAVENOUS EVERY 6 HOURS PRN
Status: DISCONTINUED | OUTPATIENT
Start: 2023-02-16 | End: 2023-02-16 | Stop reason: HOSPADM

## 2023-02-16 RX ORDER — METHYLERGONOVINE MALEATE 0.2 MG/ML
200 INJECTION INTRAVENOUS
Status: DISCONTINUED | OUTPATIENT
Start: 2023-02-16 | End: 2023-02-16 | Stop reason: HOSPADM

## 2023-02-16 RX ORDER — MISOPROSTOL 200 UG/1
400 TABLET ORAL
Status: DISCONTINUED | OUTPATIENT
Start: 2023-02-16 | End: 2023-02-18 | Stop reason: HOSPADM

## 2023-02-16 RX ORDER — METOCLOPRAMIDE HYDROCHLORIDE 5 MG/ML
10 INJECTION INTRAMUSCULAR; INTRAVENOUS EVERY 6 HOURS PRN
Status: DISCONTINUED | OUTPATIENT
Start: 2023-02-16 | End: 2023-02-16 | Stop reason: HOSPADM

## 2023-02-16 RX ORDER — ONDANSETRON 4 MG/1
4 TABLET, ORALLY DISINTEGRATING ORAL EVERY 6 HOURS PRN
Status: DISCONTINUED | OUTPATIENT
Start: 2023-02-16 | End: 2023-02-16 | Stop reason: HOSPADM

## 2023-02-16 RX ORDER — CEFAZOLIN SODIUM 1 G/3ML
1 INJECTION, POWDER, FOR SOLUTION INTRAMUSCULAR; INTRAVENOUS EVERY 8 HOURS
Status: DISCONTINUED | OUTPATIENT
Start: 2023-02-16 | End: 2023-02-16 | Stop reason: HOSPADM

## 2023-02-16 RX ORDER — OXYTOCIN/0.9 % SODIUM CHLORIDE 30/500 ML
340 PLASTIC BAG, INJECTION (ML) INTRAVENOUS CONTINUOUS PRN
Status: DISCONTINUED | OUTPATIENT
Start: 2023-02-16 | End: 2023-02-16 | Stop reason: HOSPADM

## 2023-02-16 RX ORDER — IBUPROFEN 400 MG/1
800 TABLET, FILM COATED ORAL EVERY 6 HOURS PRN
Status: DISCONTINUED | OUTPATIENT
Start: 2023-02-16 | End: 2023-02-18 | Stop reason: HOSPADM

## 2023-02-16 RX ORDER — NALOXONE HYDROCHLORIDE 0.4 MG/ML
0.4 INJECTION, SOLUTION INTRAMUSCULAR; INTRAVENOUS; SUBCUTANEOUS
Status: DISCONTINUED | OUTPATIENT
Start: 2023-02-16 | End: 2023-02-16 | Stop reason: HOSPADM

## 2023-02-16 RX ORDER — BISACODYL 10 MG
10 SUPPOSITORY, RECTAL RECTAL DAILY PRN
Status: DISCONTINUED | OUTPATIENT
Start: 2023-02-16 | End: 2023-02-18 | Stop reason: HOSPADM

## 2023-02-16 RX ORDER — DOCUSATE SODIUM 100 MG/1
100 CAPSULE, LIQUID FILLED ORAL DAILY
Status: DISCONTINUED | OUTPATIENT
Start: 2023-02-16 | End: 2023-02-18 | Stop reason: HOSPADM

## 2023-02-16 RX ORDER — IBUPROFEN 400 MG/1
800 TABLET, FILM COATED ORAL
Status: DISCONTINUED | OUTPATIENT
Start: 2023-02-16 | End: 2023-02-18 | Stop reason: HOSPADM

## 2023-02-16 RX ORDER — HYDROCORTISONE 25 MG/G
CREAM TOPICAL 3 TIMES DAILY PRN
Status: DISCONTINUED | OUTPATIENT
Start: 2023-02-16 | End: 2023-02-18 | Stop reason: HOSPADM

## 2023-02-16 RX ORDER — METOCLOPRAMIDE 10 MG/1
10 TABLET ORAL EVERY 6 HOURS PRN
Status: DISCONTINUED | OUTPATIENT
Start: 2023-02-16 | End: 2023-02-16 | Stop reason: HOSPADM

## 2023-02-16 RX ORDER — KETOROLAC TROMETHAMINE 30 MG/ML
30 INJECTION, SOLUTION INTRAMUSCULAR; INTRAVENOUS
Status: DISCONTINUED | OUTPATIENT
Start: 2023-02-16 | End: 2023-02-18 | Stop reason: HOSPADM

## 2023-02-16 RX ORDER — SODIUM CHLORIDE, SODIUM LACTATE, POTASSIUM CHLORIDE, CALCIUM CHLORIDE 600; 310; 30; 20 MG/100ML; MG/100ML; MG/100ML; MG/100ML
INJECTION, SOLUTION INTRAVENOUS CONTINUOUS PRN
Status: DISCONTINUED | OUTPATIENT
Start: 2023-02-16 | End: 2023-02-16 | Stop reason: HOSPADM

## 2023-02-16 RX ORDER — LIDOCAINE 40 MG/G
CREAM TOPICAL
Status: DISCONTINUED | OUTPATIENT
Start: 2023-02-16 | End: 2023-02-16 | Stop reason: HOSPADM

## 2023-02-16 RX ORDER — TRANEXAMIC ACID 10 MG/ML
1 INJECTION, SOLUTION INTRAVENOUS EVERY 30 MIN PRN
Status: DISCONTINUED | OUTPATIENT
Start: 2023-02-16 | End: 2023-02-16 | Stop reason: HOSPADM

## 2023-02-16 RX ORDER — NALOXONE HYDROCHLORIDE 0.4 MG/ML
0.2 INJECTION, SOLUTION INTRAMUSCULAR; INTRAVENOUS; SUBCUTANEOUS
Status: DISCONTINUED | OUTPATIENT
Start: 2023-02-16 | End: 2023-02-16 | Stop reason: HOSPADM

## 2023-02-16 RX ORDER — PROCHLORPERAZINE 25 MG
25 SUPPOSITORY, RECTAL RECTAL EVERY 12 HOURS PRN
Status: DISCONTINUED | OUTPATIENT
Start: 2023-02-16 | End: 2023-02-16 | Stop reason: HOSPADM

## 2023-02-16 RX ORDER — OXYTOCIN 10 [USP'U]/ML
10 INJECTION, SOLUTION INTRAMUSCULAR; INTRAVENOUS
Status: DISCONTINUED | OUTPATIENT
Start: 2023-02-16 | End: 2023-02-18 | Stop reason: HOSPADM

## 2023-02-16 RX ORDER — CEFAZOLIN SODIUM 2 G/100ML
2 INJECTION, SOLUTION INTRAVENOUS ONCE
Status: COMPLETED | OUTPATIENT
Start: 2023-02-16 | End: 2023-02-16

## 2023-02-16 RX ORDER — MISOPROSTOL 200 UG/1
400 TABLET ORAL
Status: DISCONTINUED | OUTPATIENT
Start: 2023-02-16 | End: 2023-02-16 | Stop reason: HOSPADM

## 2023-02-16 RX ORDER — ACETAMINOPHEN 325 MG/1
650 TABLET ORAL EVERY 4 HOURS PRN
Status: DISCONTINUED | OUTPATIENT
Start: 2023-02-16 | End: 2023-02-18 | Stop reason: HOSPADM

## 2023-02-16 RX ORDER — PROCHLORPERAZINE MALEATE 5 MG
10 TABLET ORAL EVERY 6 HOURS PRN
Status: DISCONTINUED | OUTPATIENT
Start: 2023-02-16 | End: 2023-02-16 | Stop reason: HOSPADM

## 2023-02-16 RX ORDER — NALBUPHINE HYDROCHLORIDE 10 MG/ML
2.5-5 INJECTION, SOLUTION INTRAMUSCULAR; INTRAVENOUS; SUBCUTANEOUS EVERY 6 HOURS PRN
Status: DISCONTINUED | OUTPATIENT
Start: 2023-02-16 | End: 2023-02-18 | Stop reason: HOSPADM

## 2023-02-16 RX ORDER — MODIFIED LANOLIN
OINTMENT (GRAM) TOPICAL
Status: DISCONTINUED | OUTPATIENT
Start: 2023-02-16 | End: 2023-02-18 | Stop reason: HOSPADM

## 2023-02-16 RX ORDER — CITRIC ACID/SODIUM CITRATE 334-500MG
30 SOLUTION, ORAL ORAL
Status: DISCONTINUED | OUTPATIENT
Start: 2023-02-16 | End: 2023-02-16 | Stop reason: HOSPADM

## 2023-02-16 RX ORDER — MISOPROSTOL 200 UG/1
800 TABLET ORAL
Status: DISCONTINUED | OUTPATIENT
Start: 2023-02-16 | End: 2023-02-18 | Stop reason: HOSPADM

## 2023-02-16 RX ORDER — CARBOPROST TROMETHAMINE 250 UG/ML
250 INJECTION, SOLUTION INTRAMUSCULAR
Status: DISCONTINUED | OUTPATIENT
Start: 2023-02-16 | End: 2023-02-16 | Stop reason: HOSPADM

## 2023-02-16 RX ORDER — OXYTOCIN/0.9 % SODIUM CHLORIDE 30/500 ML
100-340 PLASTIC BAG, INJECTION (ML) INTRAVENOUS CONTINUOUS PRN
Status: DISCONTINUED | OUTPATIENT
Start: 2023-02-16 | End: 2023-02-18 | Stop reason: HOSPADM

## 2023-02-16 RX ORDER — TRANEXAMIC ACID 10 MG/ML
1 INJECTION, SOLUTION INTRAVENOUS EVERY 30 MIN PRN
Status: DISCONTINUED | OUTPATIENT
Start: 2023-02-16 | End: 2023-02-18 | Stop reason: HOSPADM

## 2023-02-16 RX ADMIN — IBUPROFEN 800 MG: 400 TABLET ORAL at 13:36

## 2023-02-16 RX ADMIN — Medication 2 MILLI-UNITS/MIN: at 10:04

## 2023-02-16 RX ADMIN — DOCUSATE SODIUM 100 MG: 100 CAPSULE, LIQUID FILLED ORAL at 13:36

## 2023-02-16 RX ADMIN — IBUPROFEN 800 MG: 400 TABLET ORAL at 19:54

## 2023-02-16 RX ADMIN — Medication 12 ML/HR: at 10:02

## 2023-02-16 RX ADMIN — ACETAMINOPHEN 650 MG: 325 TABLET, FILM COATED ORAL at 13:36

## 2023-02-16 RX ADMIN — ACETAMINOPHEN 650 MG: 325 TABLET, FILM COATED ORAL at 19:55

## 2023-02-16 RX ADMIN — CEFAZOLIN SODIUM 2 G: 2 INJECTION, SOLUTION INTRAVENOUS at 08:17

## 2023-02-16 RX ADMIN — SODIUM CHLORIDE, POTASSIUM CHLORIDE, SODIUM LACTATE AND CALCIUM CHLORIDE: 600; 310; 30; 20 INJECTION, SOLUTION INTRAVENOUS at 08:17

## 2023-02-16 ASSESSMENT — ACTIVITIES OF DAILY LIVING (ADL)
FALL_HISTORY_WITHIN_LAST_SIX_MONTHS: NO
WALKING_OR_CLIMBING_STAIRS_DIFFICULTY: NO
ADLS_ACUITY_SCORE: 18
CONCENTRATING,_REMEMBERING_OR_MAKING_DECISIONS_DIFFICULTY: NO
DIFFICULTY_EATING/SWALLOWING: NO
ADLS_ACUITY_SCORE: 18
WEAR_GLASSES_OR_BLIND: NO
TOILETING_ISSUES: NO
CHANGE_IN_FUNCTIONAL_STATUS_SINCE_ONSET_OF_CURRENT_ILLNESS/INJURY: NO
ADLS_ACUITY_SCORE: 18
DRESSING/BATHING_DIFFICULTY: NO
DOING_ERRANDS_INDEPENDENTLY_DIFFICULTY: NO
ADLS_ACUITY_SCORE: 18
ADLS_ACUITY_SCORE: 18

## 2023-02-16 NOTE — H&P
"[facility]   LABOR ADMIT    Cha Goode MRN# [id number]  Age: 27 year old YOB: 1995    Date of Admission: 2023    Primary care provider: Grace Benson     HPI: This is a 27 year old  at 39w0d presenting for risk-reducing IOL with favorable Bliss score. Her pregnancy has been uncomplicated. She had low risk NIPS for genetic screening - they are unaware of fetal sex. She has thrombocytopenia pre-dating pregnancy - last platelet level 141 at 36 weeks. She experienced a COVID infection in early pregnancy - she had a normal Level II ultrasound and a growth ultrasound at 38 weeks showed EFW 7#12 oz (74%ile). She has been having intermittent contractions. No LOF or VB.     Obstetrical History:  G1 rrIOL at 39 weeks - VAVD due to NRFHT     Past Medical History:  Thrombocytopenia    Past Surgical History:  This patient has no significant past surgical history     Social History:  This patient has no significant social history     Family History:  This patient has no significant family history     Allergies:  PCN - rash    Physical Exam:  Ht 1.702 m (5' 7\")   Wt 72.6 kg (160 lb)   LMP 2022 (Approximate)   Breastfeeding No   BMI 25.06 kg/m    Gen: NAD  Abd: soft, NT, ND, gravid  Ext: NT, nonedematous  SVE: 3/60/-2 posterior - amniotomy clear fluid    FHT: 135/mod/+accels/no decels  [place]: intermittent ctx    Prenatal Labs:   Lab Results   Component Value Date    ABO O 2020    RH Pos 2020    AS Neg 2020    HEPBANG Negative 10/07/2019    CHPCRT Negative 2022    GCPCRT Negative 2022    RUBELLAABIGG Immune 10/07/2019    HGB 12.1 2020       GBS Status:   Lab Results   Component Value Date    GBS positive (A) 2023       Assessment:  This is a 27 year old  at 39w0d admitted to L&D for risk-reducing IOL     Plan:  Admitted to L&D.  GBS pos - PCN allergic with rash only, ancef ordered and initiated at 0815  Initiate " pitocin per protocol  Amniotomy now  Anticipate     *between about 5248-5902 I will be off-site for another patient's surgery and Dr. Arreguin can be contacted with issues during this time; I will present to L&D immediately upon my return to assess labor progress and plan*     Danii Ramos MD  2023 0859

## 2023-02-16 NOTE — L&D DELIVERY NOTE
OB Vaginal Delivery Summary    Cha Goode   Admission date: 2023  Delivery date:  23  Place of delivery: [facility]    The patient is a 27 year old  at 39w0d  wks gestation admitted to labor and delivery for risk-reducing IOL.  Her  course was uncomplaited.  The estimate fetal weight is 8#. GBS was positive and she received ancef due to PCN allergy.    Membranes artificially ruptured and the fluid was clear. Pitocin was administered per protocol. For pain management she had epidural with good relief. During labor the fetal heart tones were Category I.    She progressed to complete dilation at 1205. She began pushing at 1220. She had excellent maternal expulsive efforts. Throughout the second stage, fetal heart tones were Category I. At 1233 she delivered a viable male infant with apgars of 8 at 1 min and 9 at 5 minutes.  The infant was immediately placed on the maternal abdomen. The cord was clamped and cut after one minute. Weight withheld due to maternal skin to skin.    The placenta delivered spontaneously and intact.  She received pitocin upon placental delivery.  The estimated blood loss was 50 mL.    The cervix and vagina were inspected.  There was a superficial periurethral laceration which was hemostatic without need for repair.    Mother and baby did well and went to normal postpartum and  nursery.      Danii Ramos MD  2023 [id number]

## 2023-02-16 NOTE — ANESTHESIA PROCEDURE NOTES
Epidural catheter Procedure Note    Pre-Procedure   Staff -        Anesthesiologist:  David Graham MD       Performed By: anesthesiologist       Location: OB       Pre-Anesthestic Checklist: patient identified, IV checked, site marked, risks and benefits discussed, informed consent, monitors and equipment checked, pre-op evaluation and at physician/surgeon's request  Timeout:       Correct Patient: Yes        Correct Procedure: Yes        Correct Site: Yes        Correct Position: Yes   Procedure Documentation  Procedure: epidural catheter       Patient Position: sitting       Patient Prep/Sterile Barriers: sterile gloves, mask, patient draped       Skin prep: Betadine       Local skin infiltrated with 1 mL of 1% lidocaine.        Insertion Site: L3-4. (midline approach).       Technique: LORT saline        Needle Type: Touhy needle       Needle Gauge: 17.        Needle Length (Inches): 3.5        Catheter: 19 G.          Catheter threaded easily.             # of attempts: 1 and  # of redirects:  0    Assessment/Narrative         Paresthesias: No.       Test dose of 3 mL lidocaine 1.5% w/ 1:200,000 epinephrine at 09:56 CST.         Test dose negative, 3 minutes after injection, for signs of intravascular, subdural, or intrathecal injection.       Insertion/Infusion Method: LORT saline       Aspiration negative for Heme or CSF via Epidural Catheter.    Medication(s) Administered   0.125% Bupivacaine + 2 mcg/mL Fentanyl via CADD (Epidural) - EPIDURAL   9 mL - 2/16/2023 9:59:00 AM   Comments:  Pre-procedure time out completed. Patient in sitting position, the lumbar spine was prepped and draped in sterile fashion. The L3/L4 interspace was identified and local anesthetic was injected for local skin infiltration. A 17 G touhy needle was advanced to the epidural space which was confirmed with the loss of resistance technique at 5 cm. A catheter was then advanced easily into the epidural space. The catheter was  "left at 9 cm at the skin. Negative aspiration of blood and CSF was confirmed. A test dose of 1.5% lidocaine with 1:200,000 epinephrine was injected through the catheter and was negative for intravascular injection. The site was covered with sterile tegaderm and the catheter was secured with tape.       FOR [facility] ([facility]) ONLY:   Pain Team Contact information: please page the Pain Team Via [employer]. Search \"Pain\". During daytime hours, please page the attending first. At night please page the resident first.    "

## 2023-02-17 PROCEDURE — 120N000012 HC R&B POSTPARTUM

## 2023-02-17 PROCEDURE — 250N000013 HC RX MED GY IP 250 OP 250 PS 637: Performed by: OBSTETRICS & GYNECOLOGY

## 2023-02-17 RX ADMIN — ACETAMINOPHEN 650 MG: 325 TABLET, FILM COATED ORAL at 14:44

## 2023-02-17 RX ADMIN — ACETAMINOPHEN 650 MG: 325 TABLET, FILM COATED ORAL at 22:44

## 2023-02-17 RX ADMIN — IBUPROFEN 800 MG: 400 TABLET ORAL at 02:44

## 2023-02-17 RX ADMIN — IBUPROFEN 800 MG: 400 TABLET ORAL at 22:44

## 2023-02-17 RX ADMIN — IBUPROFEN 800 MG: 400 TABLET ORAL at 14:45

## 2023-02-17 RX ADMIN — DOCUSATE SODIUM 100 MG: 100 CAPSULE, LIQUID FILLED ORAL at 08:51

## 2023-02-17 RX ADMIN — ACETAMINOPHEN 650 MG: 325 TABLET, FILM COATED ORAL at 08:51

## 2023-02-17 RX ADMIN — ACETAMINOPHEN 650 MG: 325 TABLET, FILM COATED ORAL at 02:44

## 2023-02-17 RX ADMIN — IBUPROFEN 800 MG: 400 TABLET ORAL at 08:50

## 2023-02-17 ASSESSMENT — ACTIVITIES OF DAILY LIVING (ADL)
ADLS_ACUITY_SCORE: 18

## 2023-02-17 NOTE — PLAN OF CARE
VSS on RA. Minimal pain controlled with PRN Tylenol and Ibuprofen along with heat for back. Fundus firm. 1st degree laceration and scant rubra discharge. Up ad alissa. Tolerating regular diet and voiding adequately. Bonding well with baby. Breast feeding well with some mild nipple tenderness. Initial latch appeared shallow, but following latch appeared better. Eager to discharge tomorrow morning with baby.

## 2023-02-17 NOTE — DISCHARGE INSTRUCTIONS
Please call the office if you experience  - bleeding through more than a pad per hour persistently  - passage of blood clots greater than the size of cate  - abnormal vaginal discharge  - temperature greater than 100.4  - inability to tolerate food or fluid  - pain not controlled with oral medications  - persistent headache, vision changes, chest pain, shortness of breath, pain in the upper belly  - significant breast pain  - mood changes that affect your quality of life    Please make an appointment at [facility] in 6 weeks for a postpartum visit.      Postpartum Vaginal Delivery Instructions    Activity     Ask family and friends for help when you need it.  Do not place anything in your vagina for 6 weeks.  You are not restricted on other activities, but take it easy for a few weeks to allow your body to recover from delivery.  You are able to do any activities you feel up to that point.  No driving until you have stopped taking your pain medications (usually two weeks after delivery).     Call your health care provider if you have any of these symptoms:     Increased pain, swelling, redness, or fluid around your stiches from an episiotomy or perineal tear.  A fever above 100.4 F (38 C) with or without chills when placing a thermometer under your tongue.  You soak a sanitary pad with blood within 1 hour, or you see blood clots larger than a golf ball.  Bleeding that lasts more than 6 weeks.  Vaginal discharge that smells bad.  Severe pain, cramping or tenderness in your lower belly area.  A need to urinate more frequently (use the toilet more often), more urgently (use the toilet very quickly), or it burns when you urinate.  Nausea and vomiting.  Redness, swelling or pain around a vein in your leg.  Problems breastfeeding or a red or painful area on your breast.  Chest pain and cough or are gasping for air.  Problems coping with sadness, anxiety, or depression.  If you have any concerns about hurting yourself  or the baby, call your provider immediately.   You have questions or concerns after you return home.     Keep your hands clean:  Always wash your hands before touching your perineal area and stitches.  This helps reduce your risk of infection.  If your hands aren't dirty, you may use an alcohol hand-rub to clean your hands. Keep your nails clean and short.

## 2023-02-17 NOTE — PROVIDER NOTIFICATION
Notified Dr. Ramos that baby will stay overnight one more night. MD stated okay to discontinue discharge orders.

## 2023-02-17 NOTE — PROGRESS NOTES
"Cha Lovemattie  2023   PPD1 s/p     S: 27 year old  delivered at 39w0d   Doing well without complaints.  Sore but medication helping.   Lochia moderaqte.   Ambulating.  Urinating without issues.  Breastfeeding. Struggling a bit. Did successfully breastfeed her first.     O: /73 (BP Location: Right arm)   Pulse 77   Temp 98.1  F (36.7  C) (Oral)   Resp 14   Ht 1.702 m (5' 7\")   Wt 72.6 kg (160 lb)   LMP 2022 (Approximate)   Breastfeeding Unknown   BMI 25.06 kg/m    Gen: NAD      A/P:  27 year old  PPD1 s/p   - ibuprofen and acetaminophen PRN pain  - support breastfeeding  - monitor lochia  - encourage ambulation  - regular diet  - routine PP care  - stable for discharge to home today    Danii Ramos MD  Text Page (8am - 5pm)  2023 3712    Update:  Peds recommended the baby to stay until tomorrow so the discharge order was canceled for Cha.     Danii Ramos MD  2023 2213  "

## 2023-02-17 NOTE — ANESTHESIA POSTPROCEDURE EVALUATION
Patient: Cha Goode    Procedure: * No procedures listed *       Anesthesia Type:  No value filed.    Note:  Disposition: Inpatient   Postop Pain Control:    PONV:    Neuro/Psych:    Airway/Respiratory:    CV/Hemodynamics:    Other NRE:    DID A NON-ROUTINE EVENT OCCUR?     Event details/Postop Comments:  The patient was satisfied with her labor epidural and had no complaints. She is able to ambulate and denies urinary or bowel complaints.            Last vitals:  Vitals:    02/17/23 0936 02/17/23 1019 02/17/23 1620   BP:  112/86 113/68   Pulse:  98 89   Resp: 14 14 16   Temp:  36.6  C (97.9  F) 36.7  C (98.1  F)       Electronically Signed By: Álvaro Rae MD  February 17, 2023  5:29 PM

## 2023-02-17 NOTE — DISCHARGE SUMMARY
[facility]    Discharge Summary  Obstetrics    Date of Admission:  2023  Date of Discharge:  2023  Discharging Provider: Danii Ramos MD    Discharge Diagnoses   S/p     History of Present Illness   Cha Goode is a 27 year old  who presented @ 39+0 for risk-reducing IOL. Her pregnancy was uncomplicated. She went on to deliver a viable male infant weighing 7# 15.7 oz with Apgars of 8/9.    Hospital Course   The patient's hospital course was unremarkable.  She recovered as anticipated and experienced no post-delivery complications.  On discharge, her pain was well controlled. Vaginal bleeding appropriate.  Voiding without difficulty.  Ambulating well and tolerating a normal diet.  No fevers.  Breastfeeding well.  Infant stable.  She was discharged on post-partum day 1.    Post-partum hemoglobin:   Hemoglobin   Date Value Ref Range Status   2023 10.7 (L) 11.7 - 15.7 g/dL Final   2020 12.1 11.7 - 15.7 g/dL Final       Danii Ramos MD    2023 0814    Discharge Disposition   Discharged to home   Condition at discharge: Stable    Primary Care Physician   Grace Benson    Consultations This Hospital Stay   ANESTHESIOLOGY IP CONSULT  LACTATION IP CONSULT    Discharge Orders   No discharge procedures on file.  Discharge Medications   Current Discharge Medication List      CONTINUE these medications which have NOT CHANGED    Details   Prenatal Vit-Fe Fumarate-FA (PRENATAL MULTIVITAMIN  PLUS IRON) 27-1 MG TABS Take 1 tablet by mouth daily      ferrous sulfate 220 (44 Fe) MG/5ML ELIX Take 220 mg by mouth daily           Allergies   Allergies   Allergen Reactions     Penicillins Rash       Danii Ramos MD

## 2023-02-18 VITALS
BODY MASS INDEX: 25.11 KG/M2 | HEIGHT: 67 IN | WEIGHT: 160 LBS | HEART RATE: 85 BPM | SYSTOLIC BLOOD PRESSURE: 102 MMHG | RESPIRATION RATE: 16 BRPM | DIASTOLIC BLOOD PRESSURE: 66 MMHG | TEMPERATURE: 97.4 F

## 2023-02-18 PROCEDURE — 250N000013 HC RX MED GY IP 250 OP 250 PS 637: Performed by: OBSTETRICS & GYNECOLOGY

## 2023-02-18 RX ADMIN — ACETAMINOPHEN 650 MG: 325 TABLET, FILM COATED ORAL at 06:40

## 2023-02-18 RX ADMIN — DOCUSATE SODIUM 100 MG: 100 CAPSULE, LIQUID FILLED ORAL at 07:57

## 2023-02-18 RX ADMIN — IBUPROFEN 800 MG: 400 TABLET ORAL at 06:40

## 2023-02-18 ASSESSMENT — ACTIVITIES OF DAILY LIVING (ADL)
ADLS_ACUITY_SCORE: 18

## 2023-02-18 NOTE — PLAN OF CARE
VSS on RA. Pain minimal and controlled with PRN's. Up independently in room. Bonding well with baby. Voiding adequately. Had BM today. Will review AVS discharge paperwork with patient and spouse. Discharging to home later this morning.

## 2023-02-18 NOTE — PLAN OF CARE
Vital signs stable. Postpartum assessment WDL. Pain controlled with tylenol and ibuprofen, ice packs and tucks pads to perineum. Patient ambulating independently, free of dizziness or lightheadedness. Patient voiding without difficulty. Breastfeeding on cue with no assist. Patient and infant bonding well. Will continue with current plan of care.

## 2023-02-18 NOTE — PROGRESS NOTES
OB Vaginal Delivery Progress Note    Patient name: Cha Goode  : 1995  Admit date: 2023  MRN: [id number]  Acct: [id number]      Assessment/Plan:  Cha Goode is a  who is PPD#2 from .    - HD stable  - Blood type: O POS, no need for Rhogam  - VMI; breast feeding  - Cont routine PP care. Anticipate d/c home today       Subjective:  The patient did well overnight. There were no acute issues. Her pain is well controlled. She notes appropriate lochia. She is tolerating a regular diet well without nausea or vomiting. She has ambulated. She is voiding without difficulty. She denies dizziness, lightheadedness, headache, vision changes, chest pain, shortness of breath, RUQ pain, calf pain, or other complaints on ROS.    Objective:  Vitals:  Temp:  [97.4  F (36.3  C)-98.1  F (36.7  C)] 97.4  F (36.3  C)  Pulse:  [69-98] 85  Resp:  [14-16] 16  BP: (102-113)/(66-86) 102/66    Exam:  General: no acute distress  Cardiovascular: pulses intact, no peripheral edema  Pulmonary: no respiratory difficulty, normal non-labored breathing  Abdomen: soft, appropriatly tender to palpation, non-distended  Uterus: fundus firm at U-1  Extremities: BL lower extremities non-tender, no palpable cords  Psych: mood appropriate      Xochitl Herr MD  2023, 8:33 AM      For any further concerns today, please contact the on call MD for Dr. Danii johns.

## 2023-02-21 NOTE — ANESTHESIA PREPROCEDURE EVALUATION
Anesthesia Pre-Procedure Evaluation    Patient: Cha Goode   MRN: [id number] : 1995        Procedure : * No procedures listed *          No past medical history on file.   No past surgical history on file.   Allergies   Allergen Reactions     Penicillins Rash      Social History     Tobacco Use     Smoking status: Never     Smokeless tobacco: Never   Substance Use Topics     Alcohol use: Not Currently      Wt Readings from Last 1 Encounters:   23 72.6 kg (160 lb)        Anesthesia Evaluation            ROS/MED HX  ENT/Pulmonary:    (-) asthma   Neurologic:  - neg neurologic ROS     Cardiovascular:    (-) PIH   METS/Exercise Tolerance:     Hematologic:     (+) no anticoagulation therapy, no coagulopathy,     Musculoskeletal:       GI/Hepatic:     (+) GERD,     Renal/Genitourinary:       Endo:       Psychiatric/Substance Use:       Infectious Disease:       Malignancy:       Other:            Physical Exam    Airway        Mallampati: II   TM distance: > 3 FB   Neck ROM: full     Respiratory Devices and Support         Dental  no notable dental history         Cardiovascular   cardiovascular exam normal          Pulmonary   pulmonary exam normal                OUTSIDE LABS:  CBC:   Lab Results   Component Value Date    WBC 7.5 2023    WBC 8.0 2020    HGB 10.7 (L) 2023    HGB 12.1 2020    HCT 34.6 (L) 2023    HCT 37.5 2020     (L) 2023     (L) 2020     BMP: No results found for: NA, POTASSIUM, CHLORIDE, CO2, BUN, CR, GLC  COAGS: No results found for: PTT, INR, FIBR  POC: No results found for: BGM, HCG, HCGS  HEPATIC: No results found for: ALBUMIN, PROTTOTAL, ALT, AST, GGT, ALKPHOS, BILITOTAL, BILIDIRECT, TIA  OTHER: No results found for: PH, LACT, A1C, MIKE, PHOS, MAG, LIPASE, AMYLASE, TSH, T4, T3, CRP, SED    Anesthesia Plan    ASA Status:  2      Anesthesia Type: Epidural.              Consents    Anesthesia Plan(s) and associated  risks, benefits, and realistic alternatives discussed. Questions answered and patient/representative(s) expressed understanding.    - Discussed:     - Discussed with:  Patient         Postoperative Care            Comments:    Other Comments: Orders to manage the epidural infusion have been entered, and through coordination with the nurse, we will continute to manage and monitor the patient's labor epidural.  We will continuously be available to adjust as needed thruout the entire L&D process.     *late entry note. Patient history reviewed, personally interviewed and anesthetic plan discussed prior to procedure.            David Graham MD

## 2024-02-25 ENCOUNTER — HEALTH MAINTENANCE LETTER (OUTPATIENT)
Age: 29
End: 2024-02-25

## 2025-03-15 ENCOUNTER — HEALTH MAINTENANCE LETTER (OUTPATIENT)
Age: 30
End: 2025-03-15